# Patient Record
(demographics unavailable — no encounter records)

---

## 2025-06-07 NOTE — PROCEDURE
[FreeTextEntry3] : The patient has continued pain despite attempts at treatment including but not limited to rest, ice, aspirin, Ibuprofen, Aleve, Tylenol etc or prescription NSAIDS, and/or exercises at home and/ or physical therapy without significant improvement.  As a result, a cortisone injection was performed in the office. The risks, benefits, and alternatives of a cortisone injection were explained in full to the patient. Risks outlined include but are not limited to infection, sepsis, bleeding, scarring, skin discoloration, temporary increase in pain, syncopal episode, failure to resolve symptoms, allergic reaction, symptom recurrence, and elevation of blood sugar in diabetics. The patient understood the risks. All questions were answered.   Oral informed consent was obtained.  The medication used was 3 cc of 1% Lidocaine, 3 cc of .5% Marcaine and 2 cc or 12 mg of Celestone.  The injection site was cleaned using betadine or alcohol to sterilize the area. Sterile technique was utilized for the procedure including the preparation of the solutions used for the injection. 39cc normal appearing synovial fluid aspirated. The medication was injected into the RIGHT KNEE JOINT.  A bandage or band aid was applied after the injection.  The patient tolerated the procedure well and was advised to ice the injection site this evening.  Other post procedure instructions were also discussed including to call if redness, pain, fever, or any other problems occur and to apply ice for 15 min. out of every hour for the next 12-24 hours as tolerated. The patient was advised to rest the joint(s) for 2 days..    The injection was performed using ultrasound guidance to confirm accurate placement of the needle into the appropriate position. Visualization of the needle and placement of the injection was performed without complication as noted above.

## 2025-06-07 NOTE — DISCUSSION/SUMMARY
[de-identified] : Injection(s) as noted in procedure narrative.  The patient's orthopaedic condition(s) warrants intermittent use of a prescription strength non-steroidal anti-inflammatory medication (IBUPROFEN 600MG Po Bid PRN).  This medication is associated with risks including but not limited to gastrointestinal irritation, kidney damage, hypertension, and bleeding.  The patient understands and will take medication as prescribed.  The patient will stop the medication and consult a physician as needed if problems arise.  The patient has one or more conditions that would benefit from physical therapy.  The physical therapy is requested to improve any deficit in pain, range of motion, strength and other problems in the affected body part(s) as noted in the physical examination above.  A prescription for physical therapy 2 - 3 times per week for 6 weeks was prescribed for the following body parts. Right knee.  The patient should perform a home exercise program as directed. The patient should focus on the body parts affected as discussed above.   Will schedule the patient for an MRI of the right knee to assess the problem pending insurance authorization if necessary.  The patient has continued symptoms despite various treatment modalities.  The MRI will help to clarify the diagnosis and subsequent treatment plan. R/o MMT.  Follow up after MRI.

## 2025-06-07 NOTE — HISTORY OF PRESENT ILLNESS
[de-identified] : 06/06/2025: NILSA CONCEPCION, a 42 year old male, presents today for knee pain. pains started on 6/5/25 patient states he felt a pop while walking. shortly after had swelling and pain. tried aleve without relief. pain is localized to right knee.

## 2025-06-07 NOTE — HISTORY OF PRESENT ILLNESS
[de-identified] : 06/06/2025: NILSA CONCEPCION, a 42 year old male, presents today for knee pain. pains started on 6/5/25 patient states he felt a pop while walking. shortly after had swelling and pain. tried aleve without relief. pain is localized to right knee.

## 2025-06-07 NOTE — PHYSICAL EXAM
[Right] : right knee [Positive] : positive Connie [] : no erythema [TWNoteComboBox7] : flexion 100 degrees [de-identified] : extension 3 degrees

## 2025-06-07 NOTE — PHYSICAL EXAM
[Right] : right knee [Positive] : positive Connie [] : no erythema [TWNoteComboBox7] : flexion 100 degrees [de-identified] : extension 3 degrees

## 2025-06-07 NOTE — ASSESSMENT
[FreeTextEntry1] : Right knee pain with effusion for several days. Possible meniscus tear, possible osteoarthritis exacerbation, possible occult injury.    At this point, this is a new condition where the diagnosis and the long-term prognosis is uncertain.  HTN, [employer]

## 2025-06-07 NOTE — DISCUSSION/SUMMARY
[de-identified] : Injection(s) as noted in procedure narrative.  The patient's orthopaedic condition(s) warrants intermittent use of a prescription strength non-steroidal anti-inflammatory medication (IBUPROFEN 600MG Po Bid PRN).  This medication is associated with risks including but not limited to gastrointestinal irritation, kidney damage, hypertension, and bleeding.  The patient understands and will take medication as prescribed.  The patient will stop the medication and consult a physician as needed if problems arise.  The patient has one or more conditions that would benefit from physical therapy.  The physical therapy is requested to improve any deficit in pain, range of motion, strength and other problems in the affected body part(s) as noted in the physical examination above.  A prescription for physical therapy 2 - 3 times per week for 6 weeks was prescribed for the following body parts. Right knee.  The patient should perform a home exercise program as directed. The patient should focus on the body parts affected as discussed above.   Will schedule the patient for an MRI of the right knee to assess the problem pending insurance authorization if necessary.  The patient has continued symptoms despite various treatment modalities.  The MRI will help to clarify the diagnosis and subsequent treatment plan. R/o MMT.  Follow up after MRI.